# Patient Record
Sex: FEMALE | Race: WHITE | NOT HISPANIC OR LATINO | Employment: STUDENT | URBAN - METROPOLITAN AREA
[De-identification: names, ages, dates, MRNs, and addresses within clinical notes are randomized per-mention and may not be internally consistent; named-entity substitution may affect disease eponyms.]

---
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
## 2020-12-23 ENCOUNTER — OFFICE VISIT (OUTPATIENT)
Dept: INTERNAL MEDICINE | Age: 20
End: 2020-12-23

## 2020-12-23 VITALS
DIASTOLIC BLOOD PRESSURE: 74 MMHG | HEIGHT: 64 IN | WEIGHT: 202.9 LBS | SYSTOLIC BLOOD PRESSURE: 104 MMHG | RESPIRATION RATE: 16 BRPM | BODY MASS INDEX: 34.64 KG/M2 | HEART RATE: 72 BPM

## 2020-12-23 DIAGNOSIS — E66.9 OBESITY (BMI 30-39.9): ICD-10-CM

## 2020-12-23 DIAGNOSIS — Z23 NEEDS FLU SHOT: ICD-10-CM

## 2020-12-23 DIAGNOSIS — K21.9 GASTROESOPHAGEAL REFLUX DISEASE WITHOUT ESOPHAGITIS: ICD-10-CM

## 2020-12-23 DIAGNOSIS — L60.0 INGROWN RIGHT BIG TOENAIL: ICD-10-CM

## 2020-12-23 DIAGNOSIS — Z97.5 IUD (INTRAUTERINE DEVICE) IN PLACE: ICD-10-CM

## 2020-12-23 DIAGNOSIS — Z00.00 ANNUAL PHYSICAL EXAM: Primary | ICD-10-CM

## 2020-12-23 PROCEDURE — 99385 PREV VISIT NEW AGE 18-39: CPT | Performed by: INTERNAL MEDICINE

## 2020-12-23 PROCEDURE — 90686 IIV4 VACC NO PRSV 0.5 ML IM: CPT | Performed by: INTERNAL MEDICINE

## 2020-12-23 PROCEDURE — 90471 IMMUNIZATION ADMIN: CPT | Performed by: INTERNAL MEDICINE

## 2020-12-23 RX ORDER — IBUPROFEN 200 MG
200 TABLET ORAL EVERY 6 HOURS PRN
COMMUNITY

## 2020-12-23 RX ORDER — CHLORAL HYDRATE 500 MG
1 CAPSULE ORAL DAILY
COMMUNITY

## 2020-12-23 RX ORDER — OMEPRAZOLE 40 MG/1
40 CAPSULE, DELAYED RELEASE ORAL DAILY
Qty: 90 CAPSULE | Refills: 0 | Status: SHIPPED | OUTPATIENT
Start: 2020-12-23

## 2020-12-23 ASSESSMENT — PATIENT HEALTH QUESTIONNAIRE - PHQ9
2. FEELING DOWN, DEPRESSED OR HOPELESS: NOT AT ALL
7. TROUBLE CONCENTRATING ON THINGS, SUCH AS READING THE NEWSPAPER OR WATCHING TELEVISION: NOT AT ALL
3. TROUBLE FALLING OR STAYING ASLEEP OR SLEEPING TOO MUCH: NOT AT ALL
5. POOR APPETITE, WEIGHT LOSS, OR OVEREATING: NOT AT ALL
CLINICAL INTERPRETATION OF PHQ2 SCORE: NO FURTHER SCREENING NEEDED
9. THOUGHTS THAT YOU WOULD BE BETTER OFF DEAD, OR OF HURTING YOURSELF: NOT AT ALL
SUM OF ALL RESPONSES TO PHQ QUESTIONS 1-9: 0
1. LITTLE INTEREST OR PLEASURE IN DOING THINGS: NOT AT ALL
6. FEELING BAD ABOUT YOURSELF - OR THAT YOU ARE A FAILURE OR HAVE LET YOURSELF OR YOUR FAMILY DOWN: NOT AT ALL
SUM OF ALL RESPONSES TO PHQ9 QUESTIONS 1 AND 2: 0
SUM OF ALL RESPONSES TO PHQ9 QUESTIONS 1 AND 2: 0
CLINICAL INTERPRETATION OF PHQ9 SCORE: NO FURTHER SCREENING NEEDED
SUM OF ALL RESPONSES TO PHQ QUESTIONS 1-9: 0
2. FEELING DOWN, DEPRESSED OR HOPELESS: NOT AT ALL
3. TROUBLE FALLING OR STAYING ASLEEP OR SLEEPING TOO MUCH: NOT AT ALL
8. MOVING OR SPEAKING SO SLOWLY THAT OTHER PEOPLE COULD HAVE NOTICED. OR THE OPPOSITE, BEING SO FIGETY OR RESTLESS THAT YOU HAVE BEEN MOVING AROUND A LOT MORE THAN USUAL: NOT AT ALL
4. FEELING TIRED OR HAVING LITTLE ENERGY: NOT AT ALL
1. LITTLE INTEREST OR PLEASURE IN DOING THINGS: NOT AT ALL
SUM OF ALL RESPONSES TO PHQ9 QUESTIONS 1 AND 2: 0
9. THOUGHTS THAT YOU WOULD BE BETTER OFF DEAD, OR OF HURTING YOURSELF: NOT AT ALL
8. MOVING OR SPEAKING SO SLOWLY THAT OTHER PEOPLE COULD HAVE NOTICED. OR THE OPPOSITE, BEING SO FIGETY OR RESTLESS THAT YOU HAVE BEEN MOVING AROUND A LOT MORE THAN USUAL: NOT AT ALL
6. FEELING BAD ABOUT YOURSELF - OR THAT YOU ARE A FAILURE OR HAVE LET YOURSELF OR YOUR FAMILY DOWN: NOT AT ALL
CLINICAL INTERPRETATION OF PHQ2 SCORE: NO FURTHER SCREENING NEEDED
4. FEELING TIRED OR HAVING LITTLE ENERGY: NOT AT ALL
SUM OF ALL RESPONSES TO PHQ9 QUESTIONS 1 TO 9: 0
5. POOR APPETITE, WEIGHT LOSS, OR OVEREATING: NOT AT ALL
7. TROUBLE CONCENTRATING ON THINGS, SUCH AS READING THE NEWSPAPER OR WATCHING TELEVISION: NOT AT ALL

## 2020-12-26 ENCOUNTER — LAB SERVICES (OUTPATIENT)
Dept: LAB | Age: 20
End: 2020-12-26

## 2020-12-26 DIAGNOSIS — Z00.00 ANNUAL PHYSICAL EXAM: ICD-10-CM

## 2020-12-26 LAB
25(OH)D3+25(OH)D2 SERPL-MCNC: 14.9 NG/ML (ref 30–100)
ALBUMIN SERPL-MCNC: 4 G/DL (ref 3.6–5.1)
ALP SERPL-CCNC: 49 UNITS/L (ref 45–117)
ALT SERPL-CCNC: 23 UNITS/L
ANION GAP SERPL CALC-SCNC: 15 MMOL/L (ref 10–20)
AST SERPL-CCNC: 12 UNITS/L
BASOPHILS # BLD: 0 K/MCL (ref 0–0.3)
BASOPHILS NFR BLD: 0 %
BILIRUB CONJ SERPL-MCNC: 0.1 MG/DL (ref 0–0.2)
BILIRUB SERPL-MCNC: 0.2 MG/DL (ref 0.2–1)
BUN SERPL-MCNC: 14 MG/DL (ref 6–20)
BUN/CREAT SERPL: 23 (ref 7–25)
CALCIUM SERPL-MCNC: 9.1 MG/DL (ref 8.4–10.2)
CHLORIDE SERPL-SCNC: 103 MMOL/L (ref 98–107)
CHOLEST SERPL-MCNC: 208 MG/DL
CHOLEST/HDLC SERPL: 3.9 {RATIO}
CO2 SERPL-SCNC: 26 MMOL/L (ref 21–32)
CREAT SERPL-MCNC: 0.61 MG/DL (ref 0.51–0.95)
DEPRECATED RDW RBC: 38.3 FL (ref 39–50)
EOSINOPHIL # BLD: 0.2 K/MCL (ref 0–0.5)
EOSINOPHIL NFR BLD: 3 %
ERYTHROCYTE [DISTWIDTH] IN BLOOD: 12.6 % (ref 11–15)
FASTING DURATION TIME PATIENT: 12 HOURS
FASTING DURATION TIME PATIENT: 12 HOURS
GFR SERPLBLD BASED ON 1.73 SQ M-ARVRAT: >90 ML/MIN/1.73M2
GLUCOSE SERPL-MCNC: 99 MG/DL (ref 65–99)
HCT VFR BLD CALC: 42.1 % (ref 36–46.5)
HDLC SERPL-MCNC: 54 MG/DL
HGB BLD-MCNC: 14.4 G/DL (ref 12–15.5)
LDLC SERPL CALC-MCNC: 135 MG/DL
LYMPHOCYTES # BLD: 1.9 K/MCL (ref 1–4.8)
LYMPHOCYTES NFR BLD: 28 %
MCH RBC QN AUTO: 29 PG (ref 26–34)
MCHC RBC AUTO-ENTMCNC: 34.2 G/DL (ref 32–36.5)
MCV RBC AUTO: 84.9 FL (ref 78–100)
MONOCYTES # BLD: 0.5 K/MCL (ref 0.3–0.9)
MONOCYTES NFR BLD: 7 %
NEUTROPHILS # BLD: 4.3 K/MCL (ref 1.8–7.7)
NEUTROPHILS NFR BLD: 62 %
NONHDLC SERPL-MCNC: 154 MG/DL
PLATELET # BLD AUTO: 352 K/MCL (ref 140–450)
POTASSIUM SERPL-SCNC: 4.5 MMOL/L (ref 3.4–5.1)
PROT SERPL-MCNC: 6.6 G/DL (ref 6.4–8.2)
RBC # BLD: 4.96 MIL/MCL (ref 4–5.2)
SODIUM SERPL-SCNC: 139 MMOL/L (ref 135–145)
TRIGL SERPL-MCNC: 96 MG/DL
TSH SERPL-ACNC: 1.11 MCUNITS/ML (ref 0.35–5)
WBC # BLD: 6.9 K/MCL (ref 4.2–11)

## 2020-12-26 PROCEDURE — 80076 HEPATIC FUNCTION PANEL: CPT | Performed by: INTERNAL MEDICINE

## 2020-12-26 PROCEDURE — 84443 ASSAY THYROID STIM HORMONE: CPT | Performed by: INTERNAL MEDICINE

## 2020-12-26 PROCEDURE — 82306 VITAMIN D 25 HYDROXY: CPT | Performed by: INTERNAL MEDICINE

## 2020-12-26 PROCEDURE — 36415 COLL VENOUS BLD VENIPUNCTURE: CPT | Performed by: INTERNAL MEDICINE

## 2020-12-26 PROCEDURE — 85025 COMPLETE CBC W/AUTO DIFF WBC: CPT | Performed by: INTERNAL MEDICINE

## 2020-12-26 PROCEDURE — 80048 BASIC METABOLIC PNL TOTAL CA: CPT | Performed by: INTERNAL MEDICINE

## 2020-12-26 PROCEDURE — 80061 LIPID PANEL: CPT | Performed by: INTERNAL MEDICINE

## 2020-12-28 ENCOUNTER — TELEPHONE (OUTPATIENT)
Dept: INTERNAL MEDICINE | Age: 20
End: 2020-12-28

## 2020-12-28 RX ORDER — ERGOCALCIFEROL 1.25 MG/1
1.25 CAPSULE ORAL
Qty: 12 CAPSULE | Refills: 1 | Status: SHIPPED | OUTPATIENT
Start: 2020-12-28 | End: 2020-12-30 | Stop reason: SDUPTHER

## 2020-12-29 ENCOUNTER — TELEPHONE (OUTPATIENT)
Dept: INTERNAL MEDICINE | Age: 20
End: 2020-12-29

## 2020-12-30 RX ORDER — ERGOCALCIFEROL 1.25 MG/1
1.25 CAPSULE ORAL
Qty: 12 CAPSULE | Refills: 1 | Status: SHIPPED | OUTPATIENT
Start: 2021-01-04

## 2021-09-12 ENCOUNTER — OFFICE VISIT (OUTPATIENT)
Dept: URGENT CARE | Facility: URGENT CARE | Age: 21
End: 2021-09-12
Payer: COMMERCIAL

## 2021-09-12 ENCOUNTER — NURSE TRIAGE (OUTPATIENT)
Dept: NURSING | Facility: CLINIC | Age: 21
End: 2021-09-12

## 2021-09-12 VITALS
TEMPERATURE: 98.1 F | WEIGHT: 194.13 LBS | SYSTOLIC BLOOD PRESSURE: 121 MMHG | OXYGEN SATURATION: 98 % | RESPIRATION RATE: 12 BRPM | DIASTOLIC BLOOD PRESSURE: 84 MMHG | HEART RATE: 66 BPM

## 2021-09-12 DIAGNOSIS — Z97.5 IUD CONTRACEPTION: ICD-10-CM

## 2021-09-12 DIAGNOSIS — N93.9 VAGINAL BLEEDING: Primary | ICD-10-CM

## 2021-09-12 DIAGNOSIS — R10.9 ABDOMINAL CRAMPING: ICD-10-CM

## 2021-09-12 LAB — HCG UR QL: NEGATIVE

## 2021-09-12 PROCEDURE — 99203 OFFICE O/P NEW LOW 30 MIN: CPT | Performed by: PHYSICIAN ASSISTANT

## 2021-09-12 PROCEDURE — 81025 URINE PREGNANCY TEST: CPT | Performed by: PHYSICIAN ASSISTANT

## 2021-09-12 NOTE — LETTER
[facility] URGENT CARE [address] [address]  Phone: [phone]    September 12, 2021        Chano Mane  [address] [place] [address] [address] [place] [id number]          To whom it may concern:    RE: Chano Mane    Patient was seen and treated today at our clinic. Please excuse from work today.    Please contact me for questions or concerns.      Sincerely,        Sissy Morales PA-C

## 2021-09-12 NOTE — TELEPHONE ENCOUNTER
"Patient stating she had a sudden onset of brief sharp vaginal pain 45 minutes ago while sitting. Patient has Mirena IUD in.  Reporting she has \"mild cramping\" now in lower abd x 45 minutes. Vaginal bleeding occurring in past 45 minutes. Patient stating she just put a pad on now and has not soaked through pad at this time.     Reviewed reasons to seek Emergency Room Care including any severe abd pain >1 hour, soaking 2 pads per hour for 2 hours or more.  Patient plans to go into Urgent Care location now.      Yolanda Sexton RN  [facility]      COVID 19 Nurse Triage Plan/Patient Instructions    Please be aware that novel coronavirus (COVID-19) may be circulating in the community. If you develop symptoms such as fever, cough, or SOB or if you have concerns about the presence of another infection including coronavirus (COVID-19), please contact your health care provider or visit [email or web address].     Disposition/Instructions    In-Person Visit with provider recommended. Reference Visit Selection Guide.    Thank you for taking steps to prevent the spread of this virus.  o Limit your contact with others.  o Wear a simple mask to cover your cough.  o Wash your hands well and often.    Resources    [facility]: About COVID-19: [email or web address]    CDC: What to Do If You're Sick: www.cdc.gov/coronavirus/2019-ncov/about/steps-when-sick.html    CDC: Ending Home Isolation: www.cdc.gov/coronavirus/2019-ncov/hcp/disposition-in-home-patients.html     CDC: Caring for Someone: www.cdc.gov/coronavirus/2019-ncov/if-you-are-sick/care-for-someone.html     [facility]: Interim Guidance for Hospital Discharge to Home: www.health.[facility].[place]/diseases/coronavirus/hcp/hospdischarge.pdf    [facility] clinical trials (COVID-19 research studies): [email or web address]/umn-clinical-trials     Below are the COVID-19 hotlines at the [place] [facility] ([facility]). Interpreters are " available.   o For health questions: Call 554-384-8903 or 1-724.216.1467 (7 a.m. to 7 p.m.)  o For questions about schools and childcare: Call 801-470-0535 or 1-338.572.1441 (7 a.m. to 7 p.m.)                         Reason for Disposition    Worried that IUD is not in right place (e.g., not able to feel the IUD string, string longer than usual, can feel hard plastic of IUD)    Additional Information    Negative: Shock suspected (e.g., cold/pale/clammy skin, too weak to stand, low BP, rapid pulse)    Negative: Sounds like a life-threatening emergency to the triager    Negative: [1] Abdominal pain AND [2] pregnant < 20 weeks    Negative: [1] Vaginal bleeding AND [2] pregnant < 20 weeks    Negative: Vaginal discharge is main symptom    Negative: [1] SEVERE abdominal pain (e.g., excruciating) AND [2] present > 1 hour    Negative: [1] SEVERE vaginal bleeding (e.g., soaking 2 pads or tampons per hour) AND [2] present 2 or more hours    Negative: Patient sounds very sick or weak to the triager    Negative: MODERATE vaginal bleeding (i.e., soaking 1 pad or tampon per hour and present > 6 hours)    Negative: [1] Constant abdominal pain AND [2] present > 2 hours    Negative: [1] Caller has URGENT question AND [2] triager unable to answer question    Negative: [1] MILD abdominal pain (e.g., does not interfere with normal activities) AND [2] pain comes and goes (cramps) AND [3] present > 48 hours    Negative: [1] Caller has NON-URGENT question AND [2] triager unable to answer question    Negative: Pregnant    Negative: [1] Periods with > 6 soaked pads or tampons per day AND [2] last > 7 days    Protocols used: CONTRACEPTION - IUD SYMPTOMS AND [id number]

## 2021-09-12 NOTE — PROGRESS NOTES
Subjective 21-year-old presents for pelvic cramping that started this morning with vaginal bleeding.  Started out with right sharp lower pelvic/hip pain that radiated to the butt.  Then had some intense cramping with bright red bleeding.  She has had an IUD since January 2020.  She was also placed on OCPs for the past 5 months for cramping and acne.  She usually just gets intermittent brown discharge for her menses.  No fever.  No nausea or vomiting.  No dysuria, frequency.  No fever.  Prior to the bleeding no abnormal vaginal discharge.      No Known Allergies    No past medical history on file.    levonorgestrel (MIRENA) 20 MCG/24HR IUD, 1 each by Intrauterine route once  UNABLE TO FIND, MEDICATION NAME: Oral birth control -  The patient is unsure of the name    No current facility-administered medications on file prior to visit.      Social History     Tobacco Use     Smoking status: Never Smoker     Smokeless tobacco: Never Used   Substance Use Topics     Alcohol use: None     Drug use: None       ROS:  General: negative for fever  Resp: negative for chest pain   CV: negative for chest pain  ABD: as above  : negative for dysuria  Neurologic:negative for Headache    OBJECTIVE:  /84 (BP Location: Left arm, Patient Position: Sitting, Cuff Size: Adult Regular)   Pulse 66   Temp 98.1  F (36.7  C) (Oral)   Resp 12   Wt 88.1 kg (194 lb 2 oz)   SpO2 98%   Breastfeeding No    General:   awake, alert, and cooperative.  NAD.   Head: Normocephalic, atraumatic.  Eyes: Conjunctiva clear, non icteric.   Heart: Regular rate and rhythm. No murmur.  Lungs: Chest is clear; no wheezes or rales.  ABD: soft, mild right lower pelvic tenderness.  No rigidity, guarding or rebound , bowel sounds intact.  Normal active bowel sounds.  Neuro: Alert and oriented - normal speech.   No CVA tenderness.    Results for orders placed or performed in visit on 09/12/21   HCG qualitative urine     Status: Normal   Result Value Ref  Range    hCG Urine Qualitative Negative Negative       ASSESSMENT:    ICD-10-CM    1. Vaginal bleeding  N93.9 HCG qualitative urine     HCG qualitative urine     US Pelvic Complete with Transvaginal   2. Abdominal cramping  R10.9 HCG qualitative urine     US Pelvic Complete with Transvaginal   3. IUD contraception  Z97.5              PLAN: Vaginal bleeding with abdominal cramping and patient with IUD and also on OCPs.  Possible breakthrough bleeding on OCPs.  Negative ultrasound here today.  Will order pelvic ultrasound to be done tomorrow or the next day.  In the interim if pain or bleeding worsens she should go to the ER.  Also watch for fever and chills, nausea or vomiting.       Advised about symptoms which might herald more serious problems.        Sissy Morales PA-C

## 2021-09-15 ENCOUNTER — ANCILLARY PROCEDURE (OUTPATIENT)
Dept: ULTRASOUND IMAGING | Facility: CLINIC | Age: 21
End: 2021-09-15
Attending: PHYSICIAN ASSISTANT
Payer: COMMERCIAL

## 2021-09-15 DIAGNOSIS — R10.9 ABDOMINAL CRAMPING: ICD-10-CM

## 2021-09-15 DIAGNOSIS — N93.9 VAGINAL BLEEDING: ICD-10-CM

## 2021-09-15 PROCEDURE — 76856 US EXAM PELVIC COMPLETE: CPT | Performed by: RADIOLOGY

## 2021-09-15 PROCEDURE — 76830 TRANSVAGINAL US NON-OB: CPT | Performed by: RADIOLOGY

## 2021-09-18 ENCOUNTER — TELEPHONE (OUTPATIENT)
Dept: URGENT CARE | Facility: URGENT CARE | Age: 21
End: 2021-09-18

## 2021-09-22 ENCOUNTER — TELEPHONE (OUTPATIENT)
Dept: URGENT CARE | Facility: URGENT CARE | Age: 21
End: 2021-09-22

## 2021-09-22 NOTE — TELEPHONE ENCOUNTER
Ryan Lindsay CMA   9/18/2021 12:27 PM CDT         I LVM for patient to return call to clinic.     JB Motley Kimberly Jo, PA-C   9/16/2021  7:39 AM CDT         Let the patient know the radiologist felt her pelvic ultrasound was normal and the IUD was appropriately positioned. She should follow up with her Primary if no resolution. Go to the ER if it worsens. Please notify.     Patient called in about the US results and was informed of the above information.    Kalani Gutierres RN, [facility] Triage

## 2021-10-11 ENCOUNTER — HEALTH MAINTENANCE LETTER (OUTPATIENT)
Age: 21
End: 2021-10-11

## 2022-09-24 ENCOUNTER — HEALTH MAINTENANCE LETTER (OUTPATIENT)
Age: 22
End: 2022-09-24

## 2023-01-29 ENCOUNTER — HEALTH MAINTENANCE LETTER (OUTPATIENT)
Age: 23
End: 2023-01-29

## 2024-01-05 ENCOUNTER — ANCILLARY PROCEDURE (OUTPATIENT)
Dept: ULTRASOUND IMAGING | Facility: CLINIC | Age: 24
End: 2024-01-05
Attending: FAMILY MEDICINE
Payer: COMMERCIAL

## 2024-01-05 DIAGNOSIS — Z30.431 CONTRACEPTIVE, SURVEILLANCE, INTRAUTERINE DEVICE: ICD-10-CM

## 2024-01-05 PROCEDURE — 76856 US EXAM PELVIC COMPLETE: CPT | Performed by: STUDENT IN AN ORGANIZED HEALTH CARE EDUCATION/TRAINING PROGRAM

## 2024-01-05 PROCEDURE — 76830 TRANSVAGINAL US NON-OB: CPT | Performed by: STUDENT IN AN ORGANIZED HEALTH CARE EDUCATION/TRAINING PROGRAM

## 2024-02-25 ENCOUNTER — HEALTH MAINTENANCE LETTER (OUTPATIENT)
Age: 24
End: 2024-02-25

## 2024-09-23 ENCOUNTER — MEDICAL CORRESPONDENCE (OUTPATIENT)
Dept: HEALTH INFORMATION MANAGEMENT | Facility: CLINIC | Age: 24
End: 2024-09-23
Payer: COMMERCIAL

## 2024-09-25 ENCOUNTER — TRANSFERRED RECORDS (OUTPATIENT)
Dept: HEALTH INFORMATION MANAGEMENT | Facility: CLINIC | Age: 24
End: 2024-09-25
Payer: COMMERCIAL

## 2024-09-26 ENCOUNTER — TRANSCRIBE ORDERS (OUTPATIENT)
Dept: OTHER | Age: 24
End: 2024-09-26

## 2024-09-26 DIAGNOSIS — Z30.431 CONTRACEPTIVE, SURVEILLANCE, INTRAUTERINE DEVICE: Primary | ICD-10-CM

## 2024-11-11 NOTE — PROGRESS NOTES
"[facility]  Gynecology Visit    HPI:    Chano Mane is a 24 year old G0 here for Mirena IUD removal and ParaGard IUD insertion. She is hoping to try a non-hormonal option for contraception. Took tylenol around 0700 today.     GYN History  - LMP: No LMP recorded. (Menstrual status: IUD).  - Menses: amenorrheic with Mirena, prior to Mirena was having regular periods with minimal pain  - Pap Smears: NILM 12/19/2023   - History of HPV vaccine  - Contraception: Mirena IUD since 1/2020, h/o IUD strings not visible but in appropriate position per ultrasound 1/2024   - Hirsutism, on spironolactone 50 mg daily     OBHx  OB History   No obstetric history on file.       Current Outpatient Medications:     levonorgestrel (MIRENA) 20 MCG/24HR IUD, 1 each by Intrauterine route once, Disp: , Rfl:     UNABLE TO FIND, MEDICATION NAME: Oral birth control -  The patient is unsure of the name, Disp: , Rfl:   No Known Allergies  No family history on file.  Social History     Tobacco Use    Smoking status: Never    Smokeless tobacco: Never     ROS: 10-Point ROS negative except as noted in HPI    Physical Exam  /67   Pulse 65   Ht 1.613 m (5' 3.5\")   BMI 33.85 kg/m    Gen: well-appearing, NAD  HEENT: normocephalic, atraumatic  CV: warm, well perfused  Pulm: normal work of breathing and respiratory rate  Abd: soft, non-distended  Ext: no LE edema  Pelvic: normal appearing external female genitalia. Normal hair distribution. Vagina is without lesions. Scant white discharge. Cervix nulliparous, no lesions, no cervical motion tenderness.    Mirena IUD removal and ParaGard IUD insertion procedure:   A sterile speculum was placed.  The Mirena IUD strings were not visualized.  A pap brush was used to attempt to identify the strings.  An alligator forceps was used to enter the external cervical os and grab the IUD strings.  The Mirena IUD was removed intact.  Next, the anterior lip of the cervix was grasped with a single " tooth tenaculum.  A paracervical block was performed with a total of 10 cc of 1% lidocaine injected at 4 and 8 o'clock in the cervicovaginal junction.  The uterus was sounded to a depth of 7 cm.  A ParaGard IUD was placed without difficulty according to  instructions, and the strings were trimmed to 2.5 cm (LOT #: [id number] , EXP: 11/2030).      Assessment/Plan:  Chano Mane is a 24 year old G0 here for Mirena IUD removal and ParaGard IUD insertion.     # Contraception   Mirena IUD removed, ParaGard IUD inserted successfully. Discussed that ParaGard is good for up to 10 years. Reviewed that patient may experience heavy bleeding and cramping with ParaGard. Also reviewed that the ParaGard has immediate contraceptive effects and she does not need back up methods to prevent pregnancy.     Staffed with Dr. Trejo.     Anna Somers MD  Obstetrics & Gynecology, PGY-1  8:33 AM 11/12/2024    [facility] staff:  Appreciate note by Dr. Somers.  I have seen and examined the patient with the resident and was present for IUD removal and insertion as above.  I have reviewed, edited, and agree with the note.        Beverly Trejo MD, FACOG  11/12/2024  3:09 PM

## 2024-11-12 ENCOUNTER — OFFICE VISIT (OUTPATIENT)
Dept: OBGYN | Facility: CLINIC | Age: 24
End: 2024-11-12
Attending: OBSTETRICS & GYNECOLOGY
Payer: COMMERCIAL

## 2024-11-12 VITALS
HEART RATE: 65 BPM | SYSTOLIC BLOOD PRESSURE: 116 MMHG | BODY MASS INDEX: 33.85 KG/M2 | DIASTOLIC BLOOD PRESSURE: 67 MMHG | HEIGHT: 64 IN

## 2024-11-12 DIAGNOSIS — Z30.431 CONTRACEPTIVE, SURVEILLANCE, INTRAUTERINE DEVICE: ICD-10-CM

## 2024-11-12 PROCEDURE — 58300 INSERT INTRAUTERINE DEVICE: CPT | Performed by: OBSTETRICS & GYNECOLOGY

## 2024-11-12 PROCEDURE — 250N000009 HC RX 250: Performed by: OBSTETRICS & GYNECOLOGY

## 2024-11-12 PROCEDURE — 58301 REMOVE INTRAUTERINE DEVICE: CPT | Performed by: OBSTETRICS & GYNECOLOGY

## 2024-11-12 RX ORDER — COPPER 313.4 MG/1
1 INTRAUTERINE DEVICE INTRAUTERINE ONCE
Status: COMPLETED
Start: 2024-11-12 | End: 2024-11-12

## 2024-11-12 RX ADMIN — COPPER 1 EACH: 313.4 INTRAUTERINE DEVICE INTRAUTERINE at 08:00

## 2024-11-12 NOTE — PATIENT INSTRUCTIONS
Thank you for trusting us with your care!   Please be aware, if you are on Mychart, you may see your results prior to your providers review. If labs are abnormal, we will call or message you on [employer] with a follow up plan.    If you need to contact us for questions about:  Symptoms, Scheduling & Medical Questions; Non-urgent (2-3 day response) [employer] message, Urgent (needing response today) [phone] (if after 3:30pm next day response)   Prescriptions: Please call your Pharmacy   Billing: [place] [phone] or [facility]:[phone]

## 2024-11-12 NOTE — LETTER
"11/12/2024       RE: Chano Mane  [address]  [facility] [address]     Dear Colleague,    Thank you for referring your patient, Chano Mane, to the [facility] [place] [facility]. Please see a copy of my visit note below.    [facility]  Gynecology Visit    HPI:    Chano Mane is a 24 year old G0 here for Mirena IUD removal and ParaGard IUD insertion. She is hoping to try a non-hormonal option for contraception. Took tylenol around 0700 today.     GYN History  - LMP: No LMP recorded. (Menstrual status: IUD).  - Menses: amenorrheic with Mirena, prior to Mirena was having regular periods with minimal pain  - Pap Smears: NILM 12/19/2023   - History of HPV vaccine  - Contraception: Mirena IUD since 1/2020, h/o IUD strings not visible but in appropriate position per ultrasound 1/2024   - Hirsutism, on spironolactone 50 mg daily     OBHx  OB History   No obstetric history on file.       Current Outpatient Medications:      levonorgestrel (MIRENA) 20 MCG/24HR IUD, 1 each by Intrauterine route once, Disp: , Rfl:      UNABLE TO FIND, MEDICATION NAME: Oral birth control -  The patient is unsure of the name, Disp: , Rfl:   No Known Allergies  No family history on file.  Social History     Tobacco Use     Smoking status: Never     Smokeless tobacco: Never     ROS: 10-Point ROS negative except as noted in HPI    Physical Exam  /67   Pulse 65   Ht 1.613 m (5' 3.5\")   BMI 33.85 kg/m    Gen: well-appearing, NAD  HEENT: normocephalic, atraumatic  CV: warm, well perfused  Pulm: normal work of breathing and respiratory rate  Abd: soft, non-distended  Ext: no LE edema  Pelvic: normal appearing external female genitalia. Normal hair distribution. Vagina is without lesions. Scant white discharge. Cervix nulliparous, no lesions, no cervical motion tenderness.    Mirena IUD removal and ParaGard IUD insertion " procedure:   A sterile speculum was placed.  The Mirena IUD strings were not visualized.  A pap brush was used to attempt to identify the strings.  An alligator forceps was used to enter the external cervical os and grab the IUD strings.  The Mirena IUD was removed intact.  Next, the anterior lip of the cervix was grasped with a single tooth tenaculum.  A paracervical block was performed with a total of 10 cc of 1% lidocaine injected at 4 and 8 o'clock in the cervicovaginal junction.  The uterus was sounded to a depth of 7 cm.  A ParaGard IUD was placed without difficulty according to  instructions, and the strings were trimmed to 2.5 cm (LOT #: [id number] , EXP: 11/2030).      Assessment/Plan:  Chano Mane is a 24 year old G0 here for Mirena IUD removal and ParaGard IUD insertion.     # Contraception   Mirena IUD removed, ParaGard IUD inserted successfully. Discussed that ParaGard is good for up to 10 years. Reviewed that patient may experience heavy bleeding and cramping with ParaGard. Also reviewed that the ParaGard has immediate contraceptive effects and she does not need back up methods to prevent pregnancy.     Staffed with Dr. Trejo.     Anna Somers MD  Obstetrics & Gynecology, PGY-1  8:33 AM 11/12/2024    [facility] staff:  Appreciate note by Dr. Somers.  I have seen and examined the patient with the resident and was present for IUD removal and insertion as above.  I have reviewed, edited, and agree with the note.        Beverly Trejo MD, FACOG  11/12/2024  3:09 PM      Again, thank you for allowing me to participate in the care of your patient.      Sincerely,    Beverly Trejo MD

## 2024-11-18 ASSESSMENT — ANXIETY QUESTIONNAIRES
GAD7 TOTAL SCORE: 13
GAD7 TOTAL SCORE: 13
4. TROUBLE RELAXING: SEVERAL DAYS
7. FEELING AFRAID AS IF SOMETHING AWFUL MIGHT HAPPEN: MORE THAN HALF THE DAYS
7. FEELING AFRAID AS IF SOMETHING AWFUL MIGHT HAPPEN: MORE THAN HALF THE DAYS
3. WORRYING TOO MUCH ABOUT DIFFERENT THINGS: NEARLY EVERY DAY
GAD7 TOTAL SCORE: 13
1. FEELING NERVOUS, ANXIOUS, OR ON EDGE: MORE THAN HALF THE DAYS
IF YOU CHECKED OFF ANY PROBLEMS ON THIS QUESTIONNAIRE, HOW DIFFICULT HAVE THESE PROBLEMS MADE IT FOR YOU TO DO YOUR WORK, TAKE CARE OF THINGS AT HOME, OR GET ALONG WITH OTHER PEOPLE: VERY DIFFICULT
5. BEING SO RESTLESS THAT IT IS HARD TO SIT STILL: NOT AT ALL
2. NOT BEING ABLE TO STOP OR CONTROL WORRYING: NEARLY EVERY DAY
8. IF YOU CHECKED OFF ANY PROBLEMS, HOW DIFFICULT HAVE THESE MADE IT FOR YOU TO DO YOUR WORK, TAKE CARE OF THINGS AT HOME, OR GET ALONG WITH OTHER PEOPLE?: VERY DIFFICULT
6. BECOMING EASILY ANNOYED OR IRRITABLE: MORE THAN HALF THE DAYS

## 2024-11-20 ENCOUNTER — VIRTUAL VISIT (OUTPATIENT)
Dept: PSYCHOLOGY | Facility: CLINIC | Age: 24
End: 2024-11-20
Payer: COMMERCIAL

## 2024-11-20 DIAGNOSIS — F41.1 GAD (GENERALIZED ANXIETY DISORDER): Primary | ICD-10-CM

## 2024-11-20 PROCEDURE — 90791 PSYCH DIAGNOSTIC EVALUATION: CPT | Mod: 95 | Performed by: COUNSELOR

## 2024-11-20 ASSESSMENT — COLUMBIA-SUICIDE SEVERITY RATING SCALE - C-SSRS
2. HAVE YOU ACTUALLY HAD ANY THOUGHTS OF KILLING YOURSELF?: NO
TOTAL  NUMBER OF ABORTED OR SELF INTERRUPTED ATTEMPTS LIFETIME: NO
1. HAVE YOU WISHED YOU WERE DEAD OR WISHED YOU COULD GO TO SLEEP AND NOT WAKE UP?: NO
6. HAVE YOU EVER DONE ANYTHING, STARTED TO DO ANYTHING, OR PREPARED TO DO ANYTHING TO END YOUR LIFE?: NO
ATTEMPT LIFETIME: NO
TOTAL  NUMBER OF INTERRUPTED ATTEMPTS LIFETIME: NO

## 2024-11-20 ASSESSMENT — PATIENT HEALTH QUESTIONNAIRE - PHQ9
SUM OF ALL RESPONSES TO PHQ QUESTIONS 1-9: 7
10. IF YOU CHECKED OFF ANY PROBLEMS, HOW DIFFICULT HAVE THESE PROBLEMS MADE IT FOR YOU TO DO YOUR WORK, TAKE CARE OF THINGS AT HOME, OR GET ALONG WITH OTHER PEOPLE: VERY DIFFICULT
SUM OF ALL RESPONSES TO PHQ QUESTIONS 1-9: 7

## 2024-11-20 NOTE — PROGRESS NOTES
"    [facility]         PATIENT'S NAME: Chano Mane  PREFERRED NAME: Chano  PRONOUNS: she/her  MRN: [id number]  : 2000  ADDRESS: [address] [place] [id number]  ACCT. NUMBER:  [id number]  DATE OF SERVICE: 24  START TIME: 0755  END TIME: 0845  PREFERRED PHONE: [phone]  May we leave a program related message: Yes  EMERGENCY CONTACT: was not obtained  .  SERVICE MODALITY:  Video Visit:      Provider verified identity through the following two step process.  Patient provided:  Patient  and Patient address    Telemedicine Visit: The patient's condition can be safely assessed and treated via synchronous audio and visual telemedicine encounter.      Reason for Telemedicine Visit: Patient has requested telehealth visit and Services only offered telehealth    Originating Site (Patient Location): Patient's home    Distant Site (Provider Location): [facility] Setting: [facility]    Consent:  The patient/guardian has verbally consented to: the potential risks and benefits of telemedicine (video visit) versus in person care; bill my insurance or make self-payment for services provided; and responsibility for payment of non-covered services.     Patient would like the video invitation sent by:  My Chart    Mode of Communication:  Video Conference via [facility]well    Distant Location (Provider):  On-site    As the provider I attest to compliance with applicable laws and regulations related to telemedicine.    UNIVERSAL ADULT Mental Health DIAGNOSTIC ASSESSMENT    Identifying Information:  Patient is a 24 year old,  individual.  Patient was referred for an assessment by self.  Patient attended the session alone.    Chief Complaint:   The reason for seeking services at this time is: \"Anxiety and intrusive thoughts\".  She feels she has always struggled with anxiety. When school ended the intrusive thoughts about school stopped but now it could be " about her fiance dying. During COVID she had fears of dying. The problem(s) began childhood. Now an irrational thought can pop into her head like, am I a pedophile or something that would implode.    Patient has attempted to resolve these concerns in the past through listening to OCD pod casts . Hearing other people struggling with similar thoughts help her normalize.    Social/Family History:  Patient reported they grew up in other [place], [place].  They were raised by biological parents  .  Parents , father  when she was 12.  Patient reported that their childhood was good until her Dad passed away. Her Dad  suddenly from a heart attack while on a run. He was 40.Patient described their current relationships with family of origin as good.     The patient describes their cultural background as .  Cultural influences and impact on patient's life structure, values, norms, and healthcare: Not Druze and didn't have many ethnic or cultural influences growing up. Contextual influences on patient's health include: Contextual Factors: Individual Factors Having intrusive thoughts, sometimes a panic attack. Struggles with negative self talk especially about her body .    These factors will be addressed in the Preliminary Treatment plan. Patient identified their preferred language to be English. Patient reported they does not need the assistance of an  or other support involved in therapy.     Patient reported had no significant delays in developmental tasks.   Patient's highest education level was graduate school .  Patient identified the following learning problems: none reported.  Modifications will not be used to assist communication in therapy.  Patient reports they are  able to understand written materials. She did struggle with self confidence and that impacted her during school.    Patient reported the following relationship history currently engaged.  Patient's current relationship  status is engaged for 5 years.   Patient identified their sexual orientation as heterosexual.  Patient reported having 0  child(kimber). Patient identified partner; mother; siblings; pets; friends as part of their support system.  Patient identified the quality of these relationships as stable and meaningful.      Patient's current living/housing situation involves staying in own home/apartment.  The immediate members of family and household include Quentin Perry, Cece,Fozia  and they report that housing is stable.    Patient is currently employed fulltime. She really likes her job, co-workers and boss. Patient reports their finances are obtained through employment; partner. Patient does identify finances as a current stressor. She worked PT during school while her fiance brought in most of the money. Now she is trying to pay student loans, needed a car, wedding costs and is wanting to get saving and alf set up.    Patient reported that they have not been involved with the legal system.   Patient does not report being under probation/ parole/ jurisdiction. They are not under any current court jurisdiction. .    Patient's Strengths and Limitations:  Patient identified the following strengths or resources that will help them succeed in treatment: friends / good social support and family support. Things that may interfere with the patient's success in treatment include: none identified.     Assessments:  The following assessments were completed by patient for this visit:      GAD2:       11/18/2024     9:18 AM   RYANN-2   Feeling nervous, anxious, or on edge 2    Not being able to stop or control worrying 3    RYANN-2 Total Score 5        Patient-reported     GAD7:       11/18/2024     9:18 AM   RYANN-7 SCORE   Total Score 13 (moderate anxiety)   Total Score 13        Patient-reported     PROMIS 10-Global Health (all questions and answers displayed):     PROMIS 10-Global Health (only subscores and total score):        11/18/2024     9:19 AM   PROMIS-10 Scores Only   Global Mental Health Score 11    Global Physical Health Score 14    PROMIS TOTAL - SUBSCORES 25        Patient-reported     Yellow Medicine Suicide Severity Rating Scale (Lifetime/Recent)      11/20/2024     8:00 AM   Yellow Medicine Suicide Severity Rating (Lifetime/Recent)   Q1 Wish to be Dead (Lifetime) N   Q2 Non-Specific Active Suicidal Thoughts (Lifetime) N   Actual Attempt (Lifetime) N   Has subject engaged in non-suicidal self-injurious behavior? (Lifetime) N   Interrupted Attempts (Lifetime) N   Aborted or Self-Interrupted Attempt (Lifetime) N   Preparatory Acts or Behavior (Lifetime) N   Calculated C-SSRS Risk Score (Lifetime/Recent) No Risk Indicated      Personal and Family Medical History:  Patient does not report a family history of mental health concerns.  Patient reports family history is not on file.    Patient does report Mental Health Diagnosis and/or Treatment.  Patient Patient reported the following previous diagnoses which include(s): an Anxiety Disorder.  Patient reported symptoms began during childhood.   Patient has not received mental health services in the past:  participated in a grief group in . She did some counseling through the [facility] to help with her fears of fiance dying she met with a counselor a couple of time. .  Psychiatric Hospitalizations: None.  Patient denies a history of civil commitment.  Patient is not receiving other mental health services.  These include none.       Patient has not had a physical exam to rule out medical causes for current symptoms.  Date of last physical exam was within the past year. Client was encouraged to follow up with PCP if symptoms were to develop. The patient has a [facility] Care Provider, who is named No Ref-Primary, Physician..  Patient reports no current medical concerns.  Patient denies any issues with pain..   There are significant appetite / nutritional concerns / weight changes.   Patient  does report a history of head injury / trauma / cognitive impairment.  She had a concussion during Freshman year while playing soccer.    Patient reports current meds as:   Current Outpatient Medications   Medication Sig Dispense Refill    levonorgestrel (MIRENA) 20 MCG/24HR IUD 1 each by Intrauterine route once      UNABLE TO FIND MEDICATION NAME: Oral birth control -  The patient is unsure of the name       No current facility-administered medications for this visit.       Medication Adherence:  Patient reports not currently prescribed.    Patient Allergies:  No Known Allergies    Medical History:  No past medical history on file.      Current Mental Status Exam:   Appearance:  Appropriate    Eye Contact:  Good   Psychomotor:  Normal       Gait / station:  no problem  Attitude / Demeanor: Interested Pleasant  Speech      Rate / Production: Normal/ Responsive      Volume:  Normal  volume      Language:  intact  Mood:   Anxious   Affect:   Appropriate    Thought Content: Clear   Thought Process: Logical  Circumstantial      Associations: No loosening of associations  Insight:   Good   Judgment:  Intact   Orientation:  All  Attention/concentration: Good    Substance Use:   Patient did report a family history of substance use concerns; Her uncle is an alcoholic, right now he has been sober for about a year and a half. He has a history of suicidal ideation even before his brother passed away and struggled even more with depression after his brother passed away.   Patient has not received chemical dependency treatment in the past.  Patient has not ever been to detox.      Patient is not currently receiving any chemical dependency treatment.           Substance History of use Age of first use Date of last use     Pattern and duration of use (include amounts and frequency)   Alcohol currently use   17 11/16/24 REPORTS SUBSTANCE USE: reports using substance 2 times per week and has 1 beer at a time.   Patient reports  heaviest use was freshman year of college.   Cannabis   currently use 17 11/17/24 REPORTS SUBSTANCE USE: reports using substance 1 times per day and has 1 edible at a time.   Patient reports heaviest use was during grad school. Currently she would use an edible 3-4 times per week.     Amphetamines   never used     REPORTS SUBSTANCE USE: N/A   Cocaine/crack    never used       REPORTS SUBSTANCE USE: N/A   Hallucinogens never used         REPORTS SUBSTANCE USE: N/A   Inhalants never used         REPORTS SUBSTANCE USE: N/A   Heroin never used         REPORTS SUBSTANCE USE: N/A   Other Opiates never used     REPORTS SUBSTANCE USE: N/A   Benzodiazepine   never used     REPORTS SUBSTANCE USE: N/A   Barbiturates never used     REPORTS SUBSTANCE USE: N/A   Over the counter meds never used     REPORTS SUBSTANCE USE: N/A   Caffeine currently use 17   REPORTS SUBSTANCE USE: reports using substance 1 times per day and has 1 drink of coffee at a time.   Patient reports heaviest use was college.   Nicotine  never used     REPORTS SUBSTANCE USE: N/A   Other substances not listed above:  Identify:  never used     REPORTS SUBSTANCE USE: N/A     Patient reported the following problems as a result of their substance use: sometimes wishes she'd   Substance Use: No symptoms    Based on the CAGE score of 2 and clinical interview there  are indications of drug or alcohol abuse. Pt recognizes that her use of THC gummies is contributing to her negative intrusive thoughts. She told her fiance she needs to quit using them and he is supportive .    Significant Losses / Trauma / Abuse / Neglect Issues:   Patient did not serve in the .  There are indications or report of significant loss, trauma, abuse or neglect issues related to: are indications or report of significant loss, trauma, abuse or neglect issues related to the death of her father when she was 12.  Concerns for possible neglect are not present.     Safety Assessment:    Patient denies current homicidal ideation and behaviors.  Patient denies current self-injurious ideation and behaviors.    Patient denied risk behaviors associated with substance use.   Patient denies any high risk behaviors associated with mental health symptoms.  Patient reports the following current concerns for their personal safety: None.  Patient reports there are not firearms in the house.       There are no firearms in the home..    History of Safety Concerns:  Patient denied a history of homicidal ideation.     Patient denied a history of personal safety concerns.    Patient denied a history of assaultive behaviors.    Patient denied a history of sexual assault behaviors.     Patient denied a history of risk behaviors associated with substance use.  Patient denies any history of high risk behaviors associated with mental health symptoms.  Patient reports the following protective factors: strong bond to family unit, community, job, school, etc and supportive social network or family forward or future oriented thinking; dedication to family or friends; purpose; help seeking behaviors when distressed    Vulnerability Assessment:    Does the patient have a history of vulnerability such as being teased, picked on, or other indications of potential safety issues with others ?  No    Does this patient have a history of being the victim of abuse? No history of abuse reported or documented.    Does this patient have a history of victimizing others or physical/sexual aggression? No     Does the patient have a history of boundary violations?  No.    Does the patient have a history of other sexual acting out behaviors (e.g grooming)?   No    Does the patient have a history of threats to self or others? Fire setting, running away or other self-injurious behaviors?    No    Has the patient required holds or restraints to manage behavior?  No    Does the patient s history indicate the need for special precautions or  particular staffing patterns in the facility?  No      NOTE: If this screening indicates that the patient is at risk to harm self or others, notify staff at referral location.    Risk Plan:  See Recommendations for Safety and Risk Management Plan    Review of Symptoms per patient report:   Depression: No symptoms  Vanita:  No Symptoms  Psychosis: No Symptoms  Anxiety: No Symptoms  Panic:  Panic symptoms include the following and occur infrequently and last approximately an hour:, Palpitations, Sense of impending doom, and excessive worrry  Post Traumatic Stress Disorder:  No Symptoms   Eating Disorder: No Symptoms  ADD / ADHD:  No symptoms  Conduct Disorder: No symptoms  Autism Spectrum Disorder: No symptoms  Obsessive Compulsive Disorder: No Symptoms  Substance Use:  substance related decrease in work performance and decrease in school performance     Patient reports the following compulsive behaviors and treatment history:  None .      Diagnostic Criteria:   Generalized Anxiety Disorder  A. Excessive anxiety and worry about a number of events or activities (such as work or school performance).   B. The person finds it difficult to control the worry.   - Being easily fatigued.    - Difficulty concentrating or mind going blank.    - Irritability.   E. The anxiety, worry, or physical symptoms cause clinically significant distress or impairment in social, occupational, or other important areas of functioning.     - The aformentioned symptoms began in childhood year(s) ago and occurs 3 days per week and is experienced as moderate.    Functional Status:  Patient reports the following functional impairments:   None noted .     Nonprogrammatic care:  Patient is requesting basic services to address current mental health concerns.    Clinical Summary:  1. Psychosocial, Cultural and Contextual Factors: Father passed away when she was 12. Tendency to be a people pleaser, negative self talk .  2. Principal DSM5 Diagnoses   (Sustained by DSM5 Criteria Listed Above):   300.02 (F41.1) Generalized Anxiety Disorder.  3. Other Diagnoses that is relevant to services:   300.02 (F41.1) Generalized Anxiety Disorder.  4. Provisional Diagnosis:  300.02 (F41.1) Generalized Anxiety Disorder as evidenced by intrusive thoughts, heart racing.  5. Prognosis: Expect Improvement.  6. Likely consequences of symptoms if not treated: Anxiety will continue or esculate.  7. Client strengths include:  caring, employed, goal-focused, and open to suggestions / feedback .     Recommendations:     1. Plan for Safety and Risk Management:   Safety and Risk: Recommended that patient call 911 or go to the local ED should there be a change in any of these risk factors.          Report to child / adult protection services was NA.     2. Patient's identified  no additional contextual concerns .     3. Initial Treatment will focus on:    Anxiety, noticing triggering thoughts and patterns, using Butterfly technique to calm mind and body.     4. Resources/Service Plan:    services are not indicated.   Modifications to assist communication are not indicated.   Additional disability accommodations are not indicated.      5. Collaboration:   Collaboration / coordination of treatment will be initiated with the following  support professionals: primary care physician.      6.  Referrals:   The following referral(s) will be initiated:  None .       A Release of Information has been obtained for the following:  none .     Clinical Substantiation/medical necessity for the above recommendations:  Anxiety diagnosis.    7. SLOANE:    SLOANE:  Discussed the general effects of drugs and alcohol on health and well-being. Provider gave patient printed information about the  effects of chemical use on their health and well being. Recommendations:  Stop using THC gummies .     8. Records:   These were reviewed at time of assessment.   Information in this assessment was obtained from  the medical record and  provided by patient who is a good historian.    Patient will have open access to their mental health medical record.    9.   Interactive Complexity: No    10. Safety Plan:       Provider Name/ Credentials:  Isabell Mehta [facility] [place]  November 20, 2024       Answers submitted by the patient for this visit:  Patient Health Questionnaire (G7) (Submitted on 11/18/2024)  RYANN 7 TOTAL SCORE: 13

## 2024-11-26 NOTE — PROGRESS NOTES
[facility]                                     Progress Note    Patient Name: Chano Mane  Date: 11/27/2024         Service Type: Individual      Session Start Time: 0700  Session End Time: 0753     Session Length: 53    Session #: 2    Attendees: Client    Service Modality:  Video Visit:      Provider verified identity through the following two step process.  Patient provided:  Patient is known previously to provider    Telemedicine Visit: The patient's condition can be safely assessed and treated via synchronous audio and visual telemedicine encounter.      Reason for Telemedicine Visit: Patient has requested telehealth visit    Originating Site (Patient Location): Patient's home    Distant Site (Provider Location): [facility] Outpatient Setting:      Consent:  The patient/guardian has verbally consented to: the potential risks and benefits of telemedicine (video visit) versus in person care; bill my insurance or make self-payment for services provided; and responsibility for payment of non-covered services.     Patient would like the video invitation sent by:  My Chart    Mode of Communication:  Video Conference via Amwell    Distant Location (Provider):  On-site    As the provider I attest to compliance with applicable laws and regulations related to telemedicine.    DATA  Interactive Complexity: No  Crisis: No        Progress Since Last Session (Related to Symptoms / Goals / Homework):   Symptoms: No change      Homework: Completed in session      Episode of Care Goals: Minimal progress - PREPARATION (Decided to change - considering how); Intervened by negotiating a change plan and determining options / strategies for behavior change, identifying triggers, exploring social supports, and working towards setting a date to begin behavior change     Current / Ongoing Stressors and Concerns:   Continued intrusive anxious thoughts. After a social interaction last week, she was  worried she had been too excited, too much, and was I annoying. She ruminated enough she went back to her friend and apologized if she was too much. Her friend validated her and reassured her that she had been just as excited.     Her Dad passed away when she was 12, this was a traumatic event. Dad  suddenly of a heart attack while out on a run. Dad had gotten up early to take her to a strength training event, She had slept through her alarm and was tired so she didn't go. He went on a run instead. She does not blame herself.      Treatment Objective(s) Addressed in This Session:   identify 3 fears / thoughts that contribute to feeling anxious       Intervention:   CBT: Identify anxious ruminations and reframe to neutral or positive    Assessments completed prior to visit:  The following assessments were completed by patient for this visit:  PHQ2:        No data to display              GAD2:       2024     9:18 AM   RYANN-2   Feeling nervous, anxious, or on edge 2    Not being able to stop or control worrying 3    RYANN-2 Total Score 5        Patient-reported        [place] Suicide Severity Rating Scale (Short Version)      2024     7:00 AM   [place] Suicide Severity Rating (Short Version)   1. Wish to be Dead (Since Last Contact) N   2. Non-Specific Active Suicidal Thoughts (Since Last Contact) N   Actual Attempt (Since Last Contact) N   Has subject engaged in non-suicidal self-injurious behavior? (Since Last Contact) N   Interrupted Attempts (Since Last Contact) N   Aborted or Self-Interrupted Attempt (Since Last Contact) N   Preparatory Acts or Behavior (Since Last Contact) N   Suicide (Since Last Contact) N   Calculated C-SSRS Risk Score (Since Last Contact) No Risk Indicated         ASSESSMENT: Current Emotional / Mental Status (status of significant symptoms):   Risk status (Self / Other harm or suicidal ideation)   Patient denies current fears or concerns for personal safety.   Patient denies  current or recent suicidal ideation or behaviors.   Patient denies current or recent homicidal ideation or behaviors.   Patient denies current or recent self injurious behavior or ideation.   Patient denies other safety concerns.   Patient reports there has been no change in risk factors since their last session.     Patient reports there has been no change in protective factors since their last session.     Recommended that patient call 911 or go to the local ED should there be a change in any of these risk factors     Appearance:   Appropriate    Eye Contact:   Good    Psychomotor Behavior: Normal    Attitude:   Cooperative    Orientation:   All   Speech    Rate / Production: Normal     Volume:  Normal    Mood:    Normal   Affect:    Appropriate    Thought Content:  Clear    Thought Form:  Coherent  Logical    Insight:    Good      Medication Review:   No current psychiatric medications prescribed     Medication Compliance:   No     Changes in Health Issues:   None reported     Chemical Use Review:   Substance Use: Chemical use reviewed, no active concerns identified      Tobacco Use: No current tobacco use.      Diagnosis:  No diagnosis found.    Collateral Reports Completed:   Not Applicable    PLAN: (Patient Tasks / Therapist Tasks / Other)  Use OCD Stories podcast to help with validation  Identify 3 anxious thoughts that are causing her mind and body to feel calmer          Isabell Mehta [facility]                                                         ______________________________________________________________________    Individual Treatment Plan    Patient's Name: Chano Mane  YOB: 2000    Date of Creation: 2024  Date Treatment Plan Last Reviewed/Revised:     DSM5 Diagnoses: 300.02 (F41.1) Generalized Anxiety Disorder  Psychosocial / Contextual Factors: Father  suddenly when she was 12. Pt has been having anxious or obsessive ruminating thoughts, often about social  interactions.  PROMIS (reviewed every 90 days):     Referral / Collaboration:  None .    Anticipated number of session for this episode of care: 9-12 sessions  Anticipation frequency of session: Every other week  Anticipated Duration of each session: 38-52 minutes  Treatment plan will be reviewed in 90 days or when goals have been changed.     MeasurableTreatment Goal(s) related to diagnosis / functional impairment(s)  Goal 1: Client will report reduction in anxiety also as evidenced by reduction of RYANN-7 score below 6 points within the next 12 weeks.     Objective #A (Client Action)                Client will identify at least three triggers for anxiety.  Status: Continued - Date(s):11/27/2024      Intervention(s)  Therapist will provide educational materials on common triggers and signs of anxiety.     Objective #B  Client will use relaxation strategies at least two times per day to reduce the physical symptoms of anxiety.  Status: Continued - Date(s):11/27/2024     Intervention(s)  Therapist will teach relaxation strategies such as mindfulness, deep breathing, muscle relaxation, and sensory activities.     Objective #C  Client will use cognitive strategies identified in therapy to challenge anxious thoughts.  Status: Continued - Date(s):11/27/2024     Intervention(s)  Therapist will teach strategies for cognitive modification using REBT model.     Goal 2:   Client will report reduced or eliminated physiological activation when recalling a distressing event including decreased re-experiencing, decreased avoidance, and decreased hyperarousal.     Objective #A (Client Action)                Status: Continued - Date(s):11/27/2024     Client will acquire knowledge of trauma, common symptoms post-trauma, emotion regulation strategies, stress management strategies, and cognitive coping strategies.     Intervention(s)  Therapist will teach about effects of trauma on mind and body; encourage emotion identification and  expression; practice with client the stress management strategies; and teach cognitive modification.     Objective #B  Client will use Brainspotting while focusing on physiological sensations related to distressing events.  Client will assess and rate level of physiological activation.  Status: Continued - Date(s):11/27/2024     Intervention(s)  Therapist will provide information on methods to calm the body such as bi-lateral tapping, progressive relaxation and square breathing.     Patient has reviewed and agreed to the above plan.      CURTIS Jensen  November 27, 2024

## 2024-11-27 ENCOUNTER — VIRTUAL VISIT (OUTPATIENT)
Dept: PSYCHOLOGY | Facility: CLINIC | Age: 24
End: 2024-11-27
Payer: COMMERCIAL

## 2024-11-27 DIAGNOSIS — F41.1 GAD (GENERALIZED ANXIETY DISORDER): Primary | ICD-10-CM

## 2024-11-27 PROCEDURE — 90837 PSYTX W PT 60 MINUTES: CPT | Mod: 95 | Performed by: COUNSELOR

## 2024-11-27 ASSESSMENT — COLUMBIA-SUICIDE SEVERITY RATING SCALE - C-SSRS
TOTAL  NUMBER OF ABORTED OR SELF INTERRUPTED ATTEMPTS SINCE LAST CONTACT: NO
1. SINCE LAST CONTACT, HAVE YOU WISHED YOU WERE DEAD OR WISHED YOU COULD GO TO SLEEP AND NOT WAKE UP?: NO
2. HAVE YOU ACTUALLY HAD ANY THOUGHTS OF KILLING YOURSELF?: NO
TOTAL  NUMBER OF INTERRUPTED ATTEMPTS SINCE LAST CONTACT: NO
6. HAVE YOU EVER DONE ANYTHING, STARTED TO DO ANYTHING, OR PREPARED TO DO ANYTHING TO END YOUR LIFE?: NO
SUICIDE, SINCE LAST CONTACT: NO
ATTEMPT SINCE LAST CONTACT: NO

## 2024-12-10 NOTE — PROGRESS NOTES
[facility]                                     Progress Note    Patient Name: Chano Mane  Date: 12/11/2024         Service Type: Individual      Session Start Time: 200  Session End Time:      Session Length:     Session #: 3    Attendees: Client    Service Modality:  Video Visit:      Provider verified identity through the following two step process.  Patient provided:  Patient is known previously to provider    Telemedicine Visit: The patient's condition can be safely assessed and treated via synchronous audio and visual telemedicine encounter.      Reason for Telemedicine Visit: Patient has requested telehealth visit    Originating Site (Patient Location): Patient's home    Distant Site (Provider Location): [facility] Outpatient Setting:      Consent:  The patient/guardian has verbally consented to: the potential risks and benefits of telemedicine (video visit) versus in person care; bill my insurance or make self-payment for services provided; and responsibility for payment of non-covered services.     Patient would like the video invitation sent by:  My Chart    Mode of Communication:  Video Conference via Amwell    Distant Location (Provider):  On-site    As the provider I attest to compliance with applicable laws and regulations related to telemedicine.    DATA  Interactive Complexity: No  Crisis: No        Progress Since Last Session (Related to Symptoms / Goals / Homework):   Symptoms: Feels better at blocking out intrusive thoughts, reminding herself it's fine    Homework: Completed in session      Episode of Care Goals:        Current / Ongoing Stressors and Concerns:   Pt has been home the last few weeks. She was busy going to weddings and social engagements. Her sister's wedding was great but stressful. Her sister is similar to Mom and Chano is more like Dad, more go with the flow. Growing up all 3 girls were very busy. They were in sports, took piano lessons and  were in clubs. Being in all of those things gave her skills. Both parents were teachers and Mom went through a mid life crisis once all the girls were gone. She quit teaching, sold the house and moved to where her boyfriend was and moved in with him. Mom gets very defensive when they say anything about her life choices. Now her Mom bartends occasionally and she and her boyfriend have corn hole tournaments. None of the girls like the boyfriend. Chano doesn't like how he treats her mom, it's due to his own mental health issues.     Last year was the 10th anniversary of her Dad's death. They went to the [facility], which is where they used to take the family trips. Won came along. Her Mom and Won got into a fight on the last day of the trip and he went home alone. She said she was breaking up and moving out but two days later acted like nothing had happened.    Over Thanksgiving Chano went to [place] for [employer] and spending the night and was going to [employer] [place] on Thanksgiving. That night her Mom was upset she wasn't spending the night with her and didn't tell her. Pt thinks her Mom has insecurities she blows up about and is trying to not take it personally and not let it be her fault. Chano feels disappointed in her Mom, she also feels sad, sad for her Mom, and also feels anxious about when they start having kids how will her behavior escalate.    When she is traveling it's usually seeing friends and drinking. She can't always remember what exactly happened so she can spiral into wondering what happened and worries about what she said.     A couple of days ago her Mom called crying. Mom wanted to change the date they are getting together for [place] and Winifred was mean to her.      Treatment Objective(s) Addressed in This Session:   identify 3 fears / thoughts that contribute to feeling anxious       Intervention:   CBT: Identify anxious ruminations and reframe to neutral or positive    Assessments  completed prior to visit:  The following assessments were completed by patient for this visit:  PHQ2:        No data to display              GAD2:       11/18/2024     9:18 AM   RYANN-2   Feeling nervous, anxious, or on edge 2    Not being able to stop or control worrying 3    RYANN-2 Total Score 5        Patient-reported        [place] Suicide Severity Rating Scale (Short Version)      11/27/2024     7:00 AM   [place] Suicide Severity Rating (Short Version)   1. Wish to be Dead (Since Last Contact) N   2. Non-Specific Active Suicidal Thoughts (Since Last Contact) N   Actual Attempt (Since Last Contact) N   Has subject engaged in non-suicidal self-injurious behavior? (Since Last Contact) N   Interrupted Attempts (Since Last Contact) N   Aborted or Self-Interrupted Attempt (Since Last Contact) N   Preparatory Acts or Behavior (Since Last Contact) N   Suicide (Since Last Contact) N   Calculated C-SSRS Risk Score (Since Last Contact) No Risk Indicated         ASSESSMENT: Current Emotional / Mental Status (status of significant symptoms):   Risk status (Self / Other harm or suicidal ideation)   Patient denies current fears or concerns for personal safety.   Patient denies current or recent suicidal ideation or behaviors.   Patient denies current or recent homicidal ideation or behaviors.   Patient denies current or recent self injurious behavior or ideation.   Patient denies other safety concerns.   Patient reports there has been no change in risk factors since their last session.     Patient reports there has been no change in protective factors since their last session.     Recommended that patient call 911 or go to the local ED should there be a change in any of these risk factors     Appearance:   Appropriate    Eye Contact:   Good    Psychomotor Behavior: Normal    Attitude:   Cooperative    Orientation:   All   Speech    Rate / Production: Normal     Volume:  Normal    Mood:    Normal   Affect:    Appropriate     Thought Content:  Clear    Thought Form:  Coherent  Logical    Insight:    Good      Medication Review:   No current psychiatric medications prescribed     Medication Compliance:   No     Changes in Health Issues:   None reported     Chemical Use Review:   Substance Use: Chemical use reviewed, no active concerns identified      Tobacco Use: No current tobacco use.      Diagnosis:  1. RYANN (generalized anxiety disorder)        Collateral Reports Completed:   Not Applicable    PLAN: (Patient Tasks / Therapist Tasks / Other)  Use OCD Stories podcast to help with validation  Identify 3 anxious thoughts that are causing her mind and body to feel calmer          Isabell Mehta [facility]                                                         ______________________________________________________________________    Individual Treatment Plan    Patient's Name: Chano Mane  YOB: 2000    Date of Creation: 2024  Date Treatment Plan Last Reviewed/Revised:     DSM5 Diagnoses: 300.02 (F41.1) Generalized Anxiety Disorder  Psychosocial / Contextual Factors: Father  suddenly when she was 12. Pt has been having anxious or obsessive ruminating thoughts, often about social interactions.  PROMIS (reviewed every 90 days):     Referral / Collaboration:  None .    Anticipated number of session for this episode of care: 9-12 sessions  Anticipation frequency of session: Every other week  Anticipated Duration of each session: 38-52 minutes  Treatment plan will be reviewed in 90 days or when goals have been changed.     MeasurableTreatment Goal(s) related to diagnosis / functional impairment(s)  Goal 1: Client will report reduction in anxiety also as evidenced by reduction of RYANN-7 score below 6 points within the next 12 weeks.     Objective #A (Client Action)                Client will identify at least three triggers for anxiety.  Status: Continued - Date(s):2024      Intervention(s)  Therapist will  provide educational materials on common triggers and signs of anxiety.     Objective #B  Client will use relaxation strategies at least two times per day to reduce the physical symptoms of anxiety.  Status: Continued - Date(s):11/27/2024     Intervention(s)  Therapist will teach relaxation strategies such as mindfulness, deep breathing, muscle relaxation, and sensory activities.     Objective #C  Client will use cognitive strategies identified in therapy to challenge anxious thoughts.  Status: Continued - Date(s):11/27/2024     Intervention(s)  Therapist will teach strategies for cognitive modification using REBT model.     Goal 2:   Client will report reduced or eliminated physiological activation when recalling a distressing event including decreased re-experiencing, decreased avoidance, and decreased hyperarousal.     Objective #A (Client Action)                Status: Continued - Date(s):11/27/2024     Client will acquire knowledge of trauma, common symptoms post-trauma, emotion regulation strategies, stress management strategies, and cognitive coping strategies.     Intervention(s)  Therapist will teach about effects of trauma on mind and body; encourage emotion identification and expression; practice with client the stress management strategies; and teach cognitive modification.     Objective #B  Client will use Brainspotting while focusing on physiological sensations related to distressing events.  Client will assess and rate level of physiological activation.  Status: Continued - Date(s):11/27/2024     Intervention(s)  Therapist will provide information on methods to calm the body such as bi-lateral tapping, progressive relaxation and square breathing.     Patient has reviewed and agreed to the above plan.      Isabell Mehta [facility]  November 27, 2024

## 2024-12-11 ENCOUNTER — VIRTUAL VISIT (OUTPATIENT)
Dept: PSYCHOLOGY | Facility: CLINIC | Age: 24
End: 2024-12-11
Payer: COMMERCIAL

## 2024-12-11 DIAGNOSIS — F41.1 GAD (GENERALIZED ANXIETY DISORDER): Primary | ICD-10-CM

## 2024-12-11 PROCEDURE — 90837 PSYTX W PT 60 MINUTES: CPT | Mod: 95 | Performed by: COUNSELOR

## 2025-03-15 ENCOUNTER — HEALTH MAINTENANCE LETTER (OUTPATIENT)
Age: 25
End: 2025-03-15

## (undated) RX ORDER — COPPER 313.4 MG/1
INTRAUTERINE DEVICE INTRAUTERINE
Status: DISPENSED
Start: 2024-11-12

## (undated) RX ORDER — LIDOCAINE HYDROCHLORIDE 10 MG/ML
INJECTION, SOLUTION INFILTRATION; PERINEURAL
Status: DISPENSED
Start: 2024-11-12